# Patient Record
Sex: FEMALE | Race: WHITE
[De-identification: names, ages, dates, MRNs, and addresses within clinical notes are randomized per-mention and may not be internally consistent; named-entity substitution may affect disease eponyms.]

---

## 2018-02-11 ENCOUNTER — HOSPITAL ENCOUNTER (INPATIENT)
Dept: HOSPITAL 33 - ED | Age: 78
LOS: 4 days | Discharge: NURSING, REHAB OR LONG-TERM CARE | DRG: 536 | End: 2018-02-15
Attending: GENERAL PRACTICE | Admitting: GENERAL PRACTICE
Payer: MEDICARE

## 2018-02-11 DIAGNOSIS — E03.9: ICD-10-CM

## 2018-02-11 DIAGNOSIS — I10: ICD-10-CM

## 2018-02-11 DIAGNOSIS — M19.90: ICD-10-CM

## 2018-02-11 DIAGNOSIS — E86.0: ICD-10-CM

## 2018-02-11 DIAGNOSIS — M25.551: ICD-10-CM

## 2018-02-11 DIAGNOSIS — G30.9: ICD-10-CM

## 2018-02-11 DIAGNOSIS — S32.039A: ICD-10-CM

## 2018-02-11 DIAGNOSIS — E11.9: ICD-10-CM

## 2018-02-11 DIAGNOSIS — I25.2: ICD-10-CM

## 2018-02-11 DIAGNOSIS — F02.80: ICD-10-CM

## 2018-02-11 DIAGNOSIS — S32.9XXA: Primary | ICD-10-CM

## 2018-02-11 DIAGNOSIS — M25.552: ICD-10-CM

## 2018-02-11 DIAGNOSIS — F03.90: ICD-10-CM

## 2018-02-11 DIAGNOSIS — E53.8: ICD-10-CM

## 2018-02-11 DIAGNOSIS — I25.10: ICD-10-CM
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
## 2018-02-11 DIAGNOSIS — Z79.899: ICD-10-CM

## 2018-02-11 DIAGNOSIS — F32.9: ICD-10-CM

## 2018-02-11 LAB
AMORPH CRY URNS QL MICRO: (no result) /HPF
ANION GAP SERPL CALC-SCNC: 13.6 MEQ/L (ref 5–15)
BASOPHILS # BLD AUTO: 0.01 10*3/UL (ref 0–0.4)
BASOPHILS NFR BLD AUTO: 0.1 % (ref 0–0.4)
BUN SERPL-MCNC: 49 MG/DL (ref 9–20)
CALCIUM SPEC-MCNC: 9.5 MG/DL (ref 8.5–10.1)
CHLORIDE SERPL-SCNC: 102 MEQ/L (ref 98–107)
CO2 SERPL-SCNC: 26.4 MEQ/L (ref 21–32)
CREAT SERPL-MCNC: 1.6 MG/DL (ref 0.55–1.3)
EOSINOPHIL # BLD AUTO: 0.07 10*3/UL (ref 0–0.5)
GFR SERPLBLD BASED ON 1.73 SQ M-ARVRAT: 33 ML/MIN
GLUCOSE SERPL-MCNC: 129 MG/DL (ref 70–110)
GLUCOSE UR-MCNC: NEGATIVE MG/DL
GRANULOCYTES # BLD AUTO: 7.28 10*3/UL (ref 1.4–6.9)
HCT VFR BLD AUTO: 41.3 % (ref 35–47)
HGB BLD-MCNC: 13.3 GM/DL (ref 12–16)
LYMPHOCYTES # SPEC AUTO: 1.04 10*3/UL (ref 1–4.6)
MCH RBC QN AUTO: 28 PG (ref 26–32)
MCHC RBC AUTO-ENTMCNC: 32.2 G/DL (ref 32–36)
MONOCYTES # BLD AUTO: 0.78 10*3/UL (ref 0–1.3)
NEUTROPHILS NFR BLD AUTO: 79.3 % (ref 36–66)
PLATELET # BLD AUTO: 264 K/MM3 (ref 150–450)
POTASSIUM SERPLBLD-SCNC: 4.1 MEQ/L (ref 3.5–5.1)
PROT UR STRIP-MCNC: (no result) MG/DL
RBC # BLD AUTO: 4.75 M/MM3 (ref 4.1–5.4)
SODIUM SERPL-SCNC: 138 MEQ/L (ref 136–145)
WBC # BLD AUTO: 9.2 K/MM3 (ref 4–10.5)
WBC URNS QL MICRO: (no result) /HPF (ref 0–5)

## 2018-02-11 PROCEDURE — 83721 ASSAY OF BLOOD LIPOPROTEIN: CPT

## 2018-02-11 PROCEDURE — 82607 VITAMIN B-12: CPT

## 2018-02-11 PROCEDURE — 36415 COLL VENOUS BLD VENIPUNCTURE: CPT

## 2018-02-11 PROCEDURE — 80061 LIPID PANEL: CPT

## 2018-02-11 PROCEDURE — 72192 CT PELVIS W/O DYE: CPT

## 2018-02-11 PROCEDURE — 82306 VITAMIN D 25 HYDROXY: CPT

## 2018-02-11 PROCEDURE — 99285 EMERGENCY DEPT VISIT HI MDM: CPT

## 2018-02-11 PROCEDURE — 81000 URINALYSIS NONAUTO W/SCOPE: CPT

## 2018-02-11 PROCEDURE — 72170 X-RAY EXAM OF PELVIS: CPT

## 2018-02-11 PROCEDURE — 96360 HYDRATION IV INFUSION INIT: CPT

## 2018-02-11 PROCEDURE — 73552 X-RAY EXAM OF FEMUR 2/>: CPT

## 2018-02-11 PROCEDURE — 80048 BASIC METABOLIC PNL TOTAL CA: CPT

## 2018-02-11 PROCEDURE — 80053 COMPREHEN METABOLIC PANEL: CPT

## 2018-02-11 PROCEDURE — 72100 X-RAY EXAM L-S SPINE 2/3 VWS: CPT

## 2018-02-11 PROCEDURE — 36000 PLACE NEEDLE IN VEIN: CPT

## 2018-02-11 PROCEDURE — 84443 ASSAY THYROID STIM HORMONE: CPT

## 2018-02-11 PROCEDURE — 85025 COMPLETE CBC W/AUTO DIFF WBC: CPT

## 2018-02-11 NOTE — ERPHSYRPT
- History of Present Illness


Time Seen by Provider: 02/11/18 20:34


Source: patient


Exam Limitations: no limitations


Physician History: 





YESTERDAY AT HOME PT WAS IN THE KITCHEN AND TURNED TO TALK TO HER  WHEN 

SHE FELL BACKWARDS ONTO THE KITCHEN TABLE WITH RESULTANT BILATERAL HIP PAIN. PT 

DENIES CHEST PAIN, SHORTNESS OF AIR, TINGLING/NUMBNESS, WEAKNESS, ABDOMINAL 

PAIN. 


Allergies/Adverse Reactions: 








No Known Drug Allergies Allergy (Verified 02/11/18 20:57)


 





Hx Tetanus, Diphtheria Vaccination/Date Given: Yes (unknown)


Hx Influenza Vaccination/Date Given: No (unknown)


Hx Pneumococcal Vaccination/Date Given: No (unknown)





- Review of Systems


Constitutional: No Fever, No Chills


Respiratory: No Dyspnea


Cardiac: No Chest Pain


Abdominal/Gastrointestinal: No Abdominal Pain


Musculoskeletal: Other (BILATERAL HIP PAIN)


Neurological: No Headache


All Other Systems: Reviewed and Negative





- Past Medical History


Pertinent Past Medical History: Yes


Neurological History: Alzheimer's Disease, Dementia, TIA


ENT History: Cataracts


Cardiac History: Angina, Arrhythmia, Hypertension, Myocardial Infarction (MI)


Respiratory History: No Pertinent History


Endocrine Medical History: Hypothyroidism


Musculoskeletal History: Osteoarthritis


GI Medical History: No Pertinent History


 History: No Pertinent History


Psycho-Social History: Depression


Female Reproductive Disorders: No Pertinent History


Other Medical History: possible thyroid cancer (pt never followed up with dr)





- Past Surgical History


Past Surgical History: Yes


Neuro Surgical History: No Pertinent History


Cardiac: CABG, Cardiac Stent


Respiratory: No Pertinent History


Gastrointestinal: Hernia Repair


Genitourinary: No Pertinent History


Musculoskeletal: No Pertinent History


Female Surgical History: No Pertinent History


Other Surgical History: 3 hernia repairs





- Social History


Smoking Status: Former smoker


Exposure to second hand smoke: No


Drug Use: none


Patient Lives Alone: No (lives at home with .)





- Nursing Vital Signs


Nursing Vital Signs: 


 Initial Vital Signs











Temperature  97.2 F   02/11/18 20:49


 


Pulse Rate  82   02/11/18 20:49


 


Respiratory Rate  18   02/11/18 20:49


 


Blood Pressure  198/110   02/11/18 20:49


 


O2 Sat by Pulse Oximetry  98   02/11/18 20:49








 Pain Scale











Pain Intensity                 3

















- [place] Coma Score


Best Eye Response ([place]): (4) open spontaneously


Best Verbal Response ([place]): (5) oriented


Best Motor Response ([place]): (6) obeys commands


[place] Total: 15





- Physical Exam


General Appearance: alert


Head Injury: no evidence of injury


Eye Exam: eyes nml inspection


ENT Exam: airway nml, nml ext.inspection, other (DRY mm), No decreased hearing


Neck Exam: trachea midline


Respiratory/Chest Exam: normal breath sounds


Cardiovascular Exam: normal heart sounds


Gastrointestinal Exam: soft, normal bowel sounds, hernia (LARGE ABDOMINAL HERNIA

(ONGOING))


Back Exam: normal inspection, No vertebral tenderness


Extremity Exam: normal range of motion, tenderness (MILD TENDERNESS OVER BOTH 

HIPS WITHOUT BRUISING OR ABRASION), No pedal edema


Peripheral Pulses: dorsalis-pedis (R): 2+, dorsalis-pedis (L): 2+


Neurologic Exam: alert, cooperative


Skin Exam: warm, dry





- Course


Nursing assessment & vital signs reviewed: Yes


Ordered Tests: 


 Active Orders 24 hr











 Category Date Time Status


 


 IV Insertion STAT Care  02/11/18 21:26 Active


 


 FEMUR Stat Exams  02/11/18 20:48 Taken


 


 FEMUR Stat Exams  02/11/18 22:47 Taken


 


 PELVIS (1 OR 2 VIEWS) Stat Exams  02/11/18 20:48 Taken


 


 PELVIS WITHOUT CONTRAST [CT] Stat Exams  02/11/18 22:08 Taken


 


 BMP Stat Lab  02/11/18 21:00 Completed


 


 CBC W DIFF Stat Lab  02/11/18 21:00 Completed


 


 UA W/ MICROSCOPIC Stat Lab  02/11/18 23:17 Completed








Medication Summary














Discontinued Medications














Generic Name Dose Route Start Last Admin





  Trade Name Manuel  PRN Reason Stop Dose Admin


 


Clonidine  0.1 mg  02/11/18 20:50  02/11/18 20:56





  Catapres 0.1 Mg***  PO  02/11/18 20:51  0.1 mg





  STAT ONE   Administration


 


Clonidine  Confirm  02/11/18 20:54  





  Catapres 0.1 Mg***  Administered  02/11/18 20:55  





  Dose   





  0.1 mg   





  .ROUTE   





  .STK-MED ONE   


 


Sodium Chloride  1,000 mls @ 999 mls/hr  02/11/18 21:26  02/11/18 22:06





  Sodium Chloride 0.9% 1000 Ml  IV  02/11/18 22:26  999 mls/hr





  .Q1H1M STA   Administration


 


Sodium Chloride  Confirm  02/11/18 21:42  





  Sodium Chloride 0.9% 1000 Ml  Administered  02/11/18 21:43  





  Dose   





  1,000 mls @ ud   





  .ROUTE   





  .STK-MED ONE   











Lab/Rad Data: 


 Laboratory Result Diagrams





 02/11/18 21:00 





 02/11/18 21:00 





 Laboratory Results











  02/11/18 02/11/18 02/11/18 Range/Units





  23:17 21:00 21:00 


 


WBC    9.2  (4.0-10.5)  K/mm3


 


RBC    4.75  (4.1-5.4)  M/mm3


 


Hgb    13.3  (12.0-16.0)  gm/dl


 


Hct    41.3  (35-47)  %


 


MCV    86.9  ()  fl


 


MCH    28.0  (26-32)  pg


 


MCHC    32.2  (32-36)  g/dl


 


RDW    14.0  (11.5-14.0)  %


 


Plt Count    264  (150-450)  K/mm3


 


MPV    10.8 H  (6-9.5)  fl


 


Gran %    79.3 H  (36.0-66.0)  %


 


Lymphocytes %    11.3 L  (24.0-44.0)  %


 


Monocytes %    8.5  (0.0-12.0)  %


 


Eosinophils %    0.8  (0.00-5.0)  %


 


Basophils %    0.1  (0.0-0.4)  %


 


Basophils #    0.01  (0-0.4)  


 


Sodium   138   (136-145)  mEq/L


 


Potassium   4.1   (3.5-5.1)  mEq/L


 


Chloride   102   ()  mEq/L


 


Carbon Dioxide   26.4   (21-32)  mEq/L


 


Anion Gap   13.6   (5-15)  MEQ/L


 


BUN   49 H   (9-20)  mg/dL


 


Creatinine   1.60 H   (0.55-1.30)  mg/dl


 


Estimated GFR   33   ML/MIN


 


Glucose   129 H   ()  MG/DL


 


Calcium   9.5   (8.5-10.1)  mg/dL


 


Ur Collection Type  CATH    


 


Urine Color  YELLOW    (YELLOW)  


 


Urine Appearance  CLEAR    (CLEAR)  


 


Urine pH  5.0    (5-6)  


 


Ur Specific Gravity  1.015    (1.005-1.025)  


 


Urine Protein  TRACE    (Negative)  


 


Urine Ketones  NEGATIVE    (NEGATIVE)  


 


Urine Blood  NEGATIVE    (0-5)  Jean-Claude/ul


 


Urine Nitrite  NEGATIVE    (NEGATIVE)  


 


Urine Bilirubin  NEGATIVE    (NEGATIVE)  


 


Urine Urobilinogen  NORMAL    (0-1)  mg/dL


 


Ur Leukocyte Esterase  NEGATIVE    (NEGATIVE)  


 


Urine Microscopic RBC  0-2    (0-2)  /HPF


 


Urine Microscopic WBC  0-2    (0-5)  /HPF


 


Ur Epithelial Cells  FEW    (FEW)  /HPF


 


Amorphous Crystals  FEW    (NEGATIVE)  /HPF


 


Urine Bacteria  FEW    (NEGATIVE)  /HPF


 


Urine Culture Reflexed  NO    (NO)  


 


Urine Glucose  NEGATIVE    (NEGATIVE)  mg/dL


 


Specimen Received  2/11/18 1252    














- Progress


Discussed with : aMcy (OBS - [id number])





- Departure


Time of Disposition: 00:21


Departure Disposition: Observation


Clinical Impression: 


 PELVIC FRACTURE, DEHYDRATION, HTN, ALZHEIMER'S DISEASE, HYPOTHROIDISM, 

ARTHRITIS, DEPRESSION





Condition: Stable


Critical Care Time: No


Referrals: 


ANYI GARCIA [Primary Care Provider] -

## 2018-02-12 LAB
ALBUMIN SERPL-MCNC: 2.9 G/DL (ref 3.4–5)
ALP SERPL-CCNC: 49 U/L (ref 46–116)
ALT SERPL-CCNC: 12 U/L (ref 12–78)
ANION GAP SERPL CALC-SCNC: 10.5 MEQ/L (ref 5–15)
AST SERPL QL: 17 U/L (ref 15–37)
BASOPHILS # BLD AUTO: 0.02 10*3/UL (ref 0–0.4)
BASOPHILS NFR BLD AUTO: 0.3 % (ref 0–0.4)
BILIRUB BLD-MCNC: 0.7 MG/DL (ref 0.2–1)
BUN SERPL-MCNC: 41 MG/DL (ref 9–20)
CALCIUM SPEC-MCNC: 8.6 MG/DL (ref 8.5–10.1)
CHLORIDE SERPL-SCNC: 107 MEQ/L (ref 98–107)
CO2 SERPL-SCNC: 25.9 MEQ/L (ref 21–32)
CREAT SERPL-MCNC: 1.38 MG/DL (ref 0.55–1.3)
EOSINOPHIL # BLD AUTO: 0.15 10*3/UL (ref 0–0.5)
GFR SERPLBLD BASED ON 1.73 SQ M-ARVRAT: 39 ML/MIN
GLUCOSE SERPL-MCNC: 127 MG/DL (ref 70–110)
GRANULOCYTES # BLD AUTO: 5.02 10*3/UL (ref 1.4–6.9)
HCT VFR BLD AUTO: 35.4 % (ref 35–47)
HGB BLD-MCNC: 11.3 GM/DL (ref 12–16)
LYMPHOCYTES # SPEC AUTO: 0.99 10*3/UL (ref 1–4.6)
MCH RBC QN AUTO: 28.3 PG (ref 26–32)
MCHC RBC AUTO-ENTMCNC: 31.9 G/DL (ref 32–36)
MONOCYTES # BLD AUTO: 0.58 10*3/UL (ref 0–1.3)
NEUTROPHILS NFR BLD AUTO: 74.3 % (ref 36–66)
PLATELET # BLD AUTO: 206 K/MM3 (ref 150–450)
POTASSIUM SERPLBLD-SCNC: 4.1 MEQ/L (ref 3.5–5.1)
PROT SERPL-MCNC: 6.7 GM/DL (ref 6.4–8.2)
RBC # BLD AUTO: 3.99 M/MM3 (ref 4.1–5.4)
SODIUM SERPL-SCNC: 139 MEQ/L (ref 136–145)
WBC # BLD AUTO: 6.8 K/MM3 (ref 4–10.5)

## 2018-02-12 RX ADMIN — MORPHINE SULFATE PRN MG: 4 INJECTION, SOLUTION INTRAMUSCULAR; INTRAVENOUS at 03:19

## 2018-02-12 RX ADMIN — HYDROCODONE BITARTRATE AND ACETAMINOPHEN PRN TAB: 5; 325 TABLET ORAL at 21:30

## 2018-02-12 RX ADMIN — ASPIRIN SCH MG: 325 TABLET, COATED ORAL at 21:30

## 2018-02-12 RX ADMIN — HYDROCODONE BITARTRATE AND ACETAMINOPHEN PRN TAB: 5; 325 TABLET ORAL at 10:12

## 2018-02-12 RX ADMIN — METOPROLOL TARTRATE SCH MG: 50 TABLET, FILM COATED ORAL at 21:30

## 2018-02-12 RX ADMIN — THERA TABS SCH TAB: TAB at 10:13

## 2018-02-12 NOTE — HP
HISTORY OF PRESENT ILLNESS:  This is a 77 year-old patient who presented to the emergency 
department reporting that she had a fall on 02/11/2018. She tells me that she stepped back 
to lean against the kitchen table and that the table gave way and she fell. She denies 
hitting her head. She denies any chest pain or palpitations before this happened. She 
denies any syncope. She reports she has pain in the small of her back and pain worse when 
she moves her right leg. In the emergency department she was found to have a pelvic 
fracture of her right inferior pubic ramus. She had other x-rays done but there is no 
report from the radiologist on these yet. She reports the pain medicine does help and she 
was able to sleep last night. The patient reports that she does have a walker and cane at 
home but that she does not use these. 



REVIEW OF SYSTEMS:  She denies any dysuria. No abdominal pain. No constipation. No 
diarrhea. No cough. No rhinorrhea. No pain in her hip. She reports pain with trying to 
change positions. No rashes. 



PAST MEDICAL HISTORY:  Vitamin B12 deficiency. Coronary artery disease. Hypothyroidism. 
History of acute renal failure that has resolved. History of diabetes mellitus type 2 
currently diet controlled. 



PAST SURGICAL HISTORY: Hernia repair x3, bypass surgery with Dr. Ryan.



MEDICATIONS:  From her clinic list: Tylenol 650 mg every four hours as needed, aspirin 81 
mg p.o. daily, vitamin B12 1 ml injected every month, levothyroxine 50 mcg p.o. daily, 
metoprolol tartrate 50 mg p.o. b.i.d. and multivitamin 1 tablet p.o. daily. 



ALLERGIES:  NKDA.



SOCIAL HISTORY:  She used to smoke but does not any longer. She lives with her . 



FAMILY HISTORY:  Her mother had heart disease. Her father had diabetes. 



PHYSICAL EXAMINATION: 

VITAL SIGNS:  Temperature current 98.6F, temperature max 98.6F, heart rate 75 to 94, 
respiratory rate 16 to 19, blood pressure 145 to 203 over 63 to 110, weight 71.2 kg.  
Oxygen saturation 93 to 98% on room air. 

GENERAL: The patient is lying in bed in no acute distress. She is alert and talkative. She 
is oriented to person, place. She said the year is 2014. She knows who the patient 
 is.  

CVS: She has a regular rate and rhythm. No murmurs, gallops or rubs are appreciated. 

CHEST: Clear to auscultation bilaterally. No crackles or wheezes. 

ABDOMEN: Obese. There is a large hernia that is her baseline. No tenderness. No guarding. 
No rigidity. Normal bowel sounds. 

EXTREMITIES: No clubbing, cyanosis or edema. She has pain with movement of her right leg 
but she is able to move her leg. Pain with changing positions with automatic bed. 

SKIN: Warm, dry and intact.  



LABORATORY DATA AND TESTS:  Hemoglobin on admission 13.3, repeat 11.3 today. Creatinine 
1.38 today. Albumin 2.9, glucose 127. UA with trace protein otherwise negative. CT scan 
reported in the HPI showed pelvic and femur x-ray without formal reading yet. 



ASSESSMENT AND PLAN:  

1) RIGHT PELVIC FRACTURE: Will ask PT to see her and will ask for orthopedic surgery 
consultation. I will continue with pain control. I have ordered hydrocodone that she can 
take by mouth and have discharge planning to help with disposition. The patient was 
encouraged to use her walker and her cane. 

2) HYPERTENSION: I will restart her metoprolol and use hydralazine as needed. 

3) HYPOTHYROIDISM: Will check her TSH and resume her levothyroxine. 

4) VITAMIN B12 DEFICIENCY:  She usually gets an injection every month. Will check her 
vitamin B12 level. 

5) MEMORY PROBLEMS: She appears to be at her baseline at this time. 

6) DEEP VENOUS THROMBOSIS PROPHYLAXIS: Will plan to use COY hose.

## 2018-02-12 NOTE — XRAY
Indication: Low back pain following fall.



Comparison: None



5 views of the lumbar spine demonstrates 5 lumbar vertebral segments in normal

alignment with age-related osteopenia and L3 superior endplate fracture of

uncertain chronicity with approximately 50% height loss.  Elsewhere mild L5-S1

degenerative disc disease as evidenced by disc space narrowing, endplate

sclerosis/spurring, and bilateral facet arthropathy including minimal L5

spondylolisthesis without spondylolysis.  Lesser degenerative changes seen of

the lower thoracic spine.  Extensive scattered vascular calcifications and

tiny gallstones.



Impression:

1.  L3 superior endplate fracture of uncertain chronicity.

2.  Osteopenia and multilevel degenerative spondylosis including minimal L5

grade 1 spondylolisthesis.

3.  Incidental extensive vascular calcifications and gallstones.

## 2018-02-12 NOTE — XRAY
Indication: Pain following fall.



Comparison: None



2 views of the right femur demonstrates osteopenia, moderate tricompartmental

knee degenerative changes, heavy scattered vascular calcifications, scattered

vascular clips, and CT proven right inferior pubic bone fracture.

## 2018-02-12 NOTE — XRAY
Indication: Pain following fall.



Comparison: None



2 views of the left femur demonstrates osteopenia, mild tricompartmental knee

degenerative changes, and heavy scattered vascular calcifications.  No other

bony, articular, or soft tissue abnormalities.

## 2018-02-12 NOTE — XRAY
Indication: Pain following fall.



Multiple contiguous axial images obtained through the pelvis with special

attention to the osseous structures.  Sagittal and coronal reformatted images

obtained.



Comparison: None



There is osteopenia, mild degenerative changes of both hips, mild degenerative

changes of the lower lumbar spine, and heavy scattered vascular

calcifications.  Nondisplaced fracture seen of the right inferior pubic ramus.

 Mild scattered colonic fecal debris and colonic diverticulosis.



Impression:

1.  Nondisplaced right inferior pubic ramus fracture.

2.  Osteopenia, degenerative changes, and colonic diverticulosis.



Comment: Preliminary interpretation was made by [facility].  No discrepancy.



CTDI 18.53

## 2018-02-12 NOTE — XRAY
Indication: Pain following fall.



Comparison: None



Single AP pelvis demonstrates osteopenia, low lumbar degenerative changes,

heavy scattered vascular calcifications, and CT proven right inferior pubic

bone fracture.

## 2018-02-13 RX ADMIN — METOPROLOL TARTRATE SCH MG: 50 TABLET, FILM COATED ORAL at 10:06

## 2018-02-13 RX ADMIN — HYDROCODONE BITARTRATE AND ACETAMINOPHEN PRN TAB: 5; 325 TABLET ORAL at 23:47

## 2018-02-13 RX ADMIN — ASPIRIN SCH MG: 325 TABLET, COATED ORAL at 10:02

## 2018-02-13 RX ADMIN — HYDROCODONE BITARTRATE AND ACETAMINOPHEN PRN TAB: 5; 325 TABLET ORAL at 14:46

## 2018-02-13 RX ADMIN — HYDROCODONE BITARTRATE AND ACETAMINOPHEN PRN TAB: 5; 325 TABLET ORAL at 10:01

## 2018-02-13 RX ADMIN — LEVOTHYROXINE SODIUM SCH MCG: 75 TABLET ORAL at 10:03

## 2018-02-13 RX ADMIN — METOPROLOL TARTRATE SCH MG: 50 TABLET, FILM COATED ORAL at 21:13

## 2018-02-13 RX ADMIN — THERA TABS SCH TAB: TAB at 10:02

## 2018-02-13 RX ADMIN — ASPIRIN SCH MG: 325 TABLET, COATED ORAL at 21:13

## 2018-02-13 RX ADMIN — DOCUSATE SODIUM SCH MG: 100 CAPSULE, LIQUID FILLED ORAL at 10:03

## 2018-02-13 RX ADMIN — DOCUSATE SODIUM SCH MG: 100 CAPSULE, LIQUID FILLED ORAL at 21:13

## 2018-02-14 LAB
ANION GAP SERPL CALC-SCNC: 11.8 MEQ/L (ref 5–15)
BASOPHILS # BLD AUTO: 0.02 10*3/UL (ref 0–0.4)
BASOPHILS NFR BLD AUTO: 0.3 % (ref 0–0.4)
BUN SERPL-MCNC: 36 MG/DL (ref 9–20)
CALCIUM SPEC-MCNC: 8.8 MG/DL (ref 8.5–10.1)
CHLORIDE SERPL-SCNC: 105 MEQ/L (ref 98–107)
CO2 SERPL-SCNC: 25.6 MEQ/L (ref 21–32)
CREAT SERPL-MCNC: 1.4 MG/DL (ref 0.55–1.3)
EOSINOPHIL # BLD AUTO: 0.18 10*3/UL (ref 0–0.5)
GFR SERPLBLD BASED ON 1.73 SQ M-ARVRAT: 39 ML/MIN
GLUCOSE SERPL-MCNC: 132 MG/DL (ref 70–110)
GRANULOCYTES # BLD AUTO: 5.01 10*3/UL (ref 1.4–6.9)
HCT VFR BLD AUTO: 38 % (ref 35–47)
HGB BLD-MCNC: 12.3 GM/DL (ref 12–16)
LYMPHOCYTES # SPEC AUTO: 0.83 10*3/UL (ref 1–4.6)
MCH RBC QN AUTO: 28.4 PG (ref 26–32)
MCHC RBC AUTO-ENTMCNC: 32.4 G/DL (ref 32–36)
MONOCYTES # BLD AUTO: 0.49 10*3/UL (ref 0–1.3)
NEUTROPHILS NFR BLD AUTO: 76.7 % (ref 36–66)
PLATELET # BLD AUTO: 239 K/MM3 (ref 150–450)
POTASSIUM SERPLBLD-SCNC: 3.9 MEQ/L (ref 3.5–5.1)
RBC # BLD AUTO: 4.33 M/MM3 (ref 4.1–5.4)
SODIUM SERPL-SCNC: 139 MEQ/L (ref 136–145)
WBC # BLD AUTO: 6.5 K/MM3 (ref 4–10.5)

## 2018-02-14 RX ADMIN — HYDROCODONE BITARTRATE AND ACETAMINOPHEN PRN TAB: 5; 325 TABLET ORAL at 15:13

## 2018-02-14 RX ADMIN — HYDROCODONE BITARTRATE AND ACETAMINOPHEN PRN TAB: 5; 325 TABLET ORAL at 09:23

## 2018-02-14 RX ADMIN — DOCUSATE SODIUM SCH MG: 100 CAPSULE, LIQUID FILLED ORAL at 21:35

## 2018-02-14 RX ADMIN — METOPROLOL TARTRATE SCH MG: 50 TABLET, FILM COATED ORAL at 09:25

## 2018-02-14 RX ADMIN — AMLODIPINE BESYLATE SCH MG: 5 TABLET ORAL at 09:25

## 2018-02-14 RX ADMIN — LEVOTHYROXINE SODIUM SCH MCG: 75 TABLET ORAL at 09:23

## 2018-02-14 RX ADMIN — DOCUSATE SODIUM SCH MG: 100 CAPSULE, LIQUID FILLED ORAL at 09:23

## 2018-02-14 RX ADMIN — METOPROLOL TARTRATE SCH MG: 50 TABLET, FILM COATED ORAL at 21:35

## 2018-02-14 RX ADMIN — ASPIRIN SCH MG: 325 TABLET, COATED ORAL at 09:23

## 2018-02-14 RX ADMIN — THERA TABS SCH TAB: TAB at 09:23

## 2018-02-14 RX ADMIN — ASPIRIN SCH MG: 325 TABLET, COATED ORAL at 21:35

## 2018-02-15 VITALS — HEART RATE: 61 BPM | OXYGEN SATURATION: 98 % | DIASTOLIC BLOOD PRESSURE: 79 MMHG | SYSTOLIC BLOOD PRESSURE: 172 MMHG

## 2018-02-15 LAB
CHOLESTANOL SERPL-MCNC: 143 MG/DL (ref 100–200)
HDLC SERPL-MCNC: 36 MG/DL (ref 35–60)
LDLC SERPL DIRECT ASSAY-MCNC: 92 MG/DL (ref 5–99)
TRIGL SERPL-MCNC: 90 MG/DL (ref 30–200)

## 2018-02-15 RX ADMIN — METOPROLOL TARTRATE SCH MG: 50 TABLET, FILM COATED ORAL at 07:47

## 2018-02-15 RX ADMIN — HYDROCODONE BITARTRATE AND ACETAMINOPHEN PRN TAB: 5; 325 TABLET ORAL at 07:46

## 2018-02-15 RX ADMIN — LEVOTHYROXINE SODIUM SCH MCG: 75 TABLET ORAL at 07:47

## 2018-02-15 RX ADMIN — THERA TABS SCH TAB: TAB at 07:47

## 2018-02-15 RX ADMIN — DOCUSATE SODIUM SCH MG: 100 CAPSULE, LIQUID FILLED ORAL at 07:47

## 2018-02-15 RX ADMIN — MORPHINE SULFATE PRN MG: 4 INJECTION, SOLUTION INTRAMUSCULAR; INTRAVENOUS at 08:59

## 2018-02-15 RX ADMIN — ASPIRIN SCH MG: 325 TABLET, COATED ORAL at 07:47

## 2018-02-15 RX ADMIN — AMLODIPINE BESYLATE SCH MG: 5 TABLET ORAL at 07:47

## 2018-02-15 RX ADMIN — HYDROCODONE BITARTRATE AND ACETAMINOPHEN PRN TAB: 5; 325 TABLET ORAL at 12:40

## 2018-02-15 NOTE — PCM.DCORD
- Discharge


Discharge Date: 02/15/18


Disposition: Skilled Care @ [facility]


Condition: Fair


Prescriptions: 


New


   Docusate Sodium 100 mg*** [Colace 100 MG***] 100 mg PO BID  capsule


   Cyanocobalamin 1000 Mcg/ml** [Cyanocobalamin B-12 1000 MCG/ML**] 1,000 mcg 

IM Q30D  vial


   Aspirin  mg*** [Ecotrin 325 MG***] 325 mg PO BID  tablet.ec


   Hydrocodone Bit/Acetaminophen [Hydrocodon-Acetaminophen 5-325] 1 tablet PO 

Q6H PRN #30 tablet MDD 4


     PRN Reason: Pain


   Metoprolol Tartrate 50 mg*** [Lopressor 50 MG***] 100 mg PO BID  tablet


   Amlodipine Besylate 5 mg*** [Norvasc 5 mg***] 5 mg PO QAM  tablet


   Senna 8.6 mg*** [Senokot 8.6 mg***] 8.6 mg PO DAILY PRN  tablet


     PRN Reason: Constipation


   Levothyroxine Sodium 75 Mcg*** [Synthroid 75 Mcg***] 75 mcg PO DAILY  tablet


   Multivitamins,Therapeutic Tab* [Theragran Multivitamin***] 1 tab PO QAM  tab


   Acetaminophen 325 mg*** [Tylenol 325 mg***] 650 mg PO Q4H PRN PRN  tablet


     PRN Reason: Pain And/Or Fever





Discontinued


   Cyanocobalamin 1000 Mcg/ml** [Cyanocobalamin B-12 1000 MCG/ML**] 1,000 mcg 

SQ UD #0 vial


   Aspirin  mg*** [Ecotrin 325 MG***] 325 mg PO QAM #0 tablet.ec


   Metoprolol Tartrate 25 mg*** [Lopressor 25MG Tab***] 25 mg PO BID #0 tab


   Acetaminophen 325 mg*** [Tylenol 325 mg***] 650 mg PO Q4H PRN PRN #0 tablet


     PRN Reason: Pain And/Or Fever


Additional Instructions: 


Follow up with  at [facility] on 3/9/18 @ 11:20 p.m. 

please come at least 15 minutes early to fill out paper work.weight bearing as 

tolerated with No restrictions.





PT/OT evaluation and treatment.





Last vitamin B 12 injection was 1/27/18. This monthly Vit B 12 injection is 

very important for Mrs. Garcia as she has a history of severe, life threatening 

Vit B 12 deficiency. 


Follow up with: 


DEJAH TELLES MD [COURTESY STAFF] - 03/09/18 11:20 am (specialty clinic )


ANYI GARCIA [Primary Care Provider] -

## 2018-02-19 NOTE — DS
DISCHARGE DIAGNOSES: 

1) PELVIC FRACTURE ON RIGHT SIDE. 

2) L3 VERTEBRAL FRACTURE. 

3) ESSENTIAL HYPERTENSION. 

4) DEMENTIA.

5) CORONARY ARTERY DISEASE. 

6) VITAMIN B12 DEFICIENCY.

7) HYPOTHYROIDISM.



DISCHARGE PHYSICAL EXAMINATION: 

VITALS: Temperature current 98.6F, temperature max 98.6F, heart rate 54 to 67, respiratory 
rate 16 to 20, blood pressure 147 to 178 over 74 to 97, weight 68.3 kg.  Oxygen saturation 
92 to 96% on room air.

GENERAL: The patient is sitting up in bed a pleasant, alert, pleasant in no acute 
distress.   She is eating her breakfast. 

CVS: She has a regular rate and rhythm. No murmurs, gallops or rubs are appreciated.

CHEST: Clear to auscultation bilaterally. No crackles or wheezes.

ABDOMEN: Soft, nontender, nondistended with normal bowel sounds. 

EXTREMITIES: No clubbing, cyanosis or edema.

SKIN: Warm, dry and intact. 



HOSPITAL COURSE: 

1) PELVIC FRACTURE: Orthopedic surgeon, Dr. Hall, was consulted and called and said he 
would see her as an outpatient, this appointment has been set up for her. She had her pain 
controlled with oral hydrocodone. She has not needed any IV morphine since Monday. The 
patient reports that she continues to have lower back pain. She denies feeling constipated 
although she has not had any stools. She has been seen by PT here and they recommend 
continued PT so will plan for her to continue with PT and OT at [place]. 

2) L3 VERTEBRAL FRACTURE: This has been taken of as above. The orthopedic surgeon also 
started aspirin 325 mg p.o. b.i.d.  She was started on this once a day for history of 
heart problems. 

3) ESSENTIAL HYPERTENSION:  Her blood pressure was high especially at the first part of 
her hospitalization. Her blood pressure medicine was adjusted. She is currently on 
metoprolol 100 mg p.o. b.i.d. with heart rate just below 60 and amlodipine 5 mg p.o. daily 
was also started. Continue with these and follow her blood pressure. 

4) DEMENTIA: She continued not to be oriented to place during her hospitalization. She was 
very cooperative her entire hospitalization. 

5) CORONARY ARTERY DISEASE: The fasting lipid profile was normal. Will continue with 
aspirin and beta blocker. I am holding off of ACE inhibitor due to some renal 
insufficiency. 

6) VITAMIN B12 DEFICIENCY:  Her  told her that her last vitamin B12 injection was 
01/27/2018. She gets one every month this is very important for her because she had 
life-threatening vitamin B12 deficiency with severe anemia in the past that resolved with 
continued treatment so she will need continued monthly vitamin B12 injection. 

7) HYPOTHYROIDISM: Her TSH was checked and was slightly high and so her Synthroid was 
increased from 150 mcg to 75 mcg. 



DISCHARGE MEDICATIONS: Docusate 100 mg p.o. b.i.d., vitamin B12 1,000 mcg IM every 30 
days, aspirin 325 mg p.o. b.i.d., hydrocodone/acetaminophen 5/325 mg 1 tablet p.o. every 
six hours as needed #30 with no refills, metoprolol tartrate 100 mg p.o. b.i.d., 
amlodipine 5 mg p.o. q.a.m., Senna 8.6 mg p.o. daily as needed for constipation, 
levothyroxine 75 mcg p.o. daily, multivitamin 1 tablet p.o. daily, Tylenol 650 mg p.o. 
every four hours as needed. 



FOLLOW UP:  She is to follow up with me in the clinic and Dr. Hall set up for 03/09/2018 
at 1120 hours.  If she would prefer to follow up with Dr. Raygoza at [place] that is 
fine as well. 



DISPOSITION: The patient was discharged to [place] in fair condition.

## 2018-09-11 ENCOUNTER — HOSPITAL ENCOUNTER (OUTPATIENT)
Dept: HOSPITAL 33 - ED | Age: 78
Setting detail: OBSERVATION
LOS: 1 days | Discharge: NURSING, REHAB OR LONG-TERM CARE | End: 2018-09-12
Attending: GENERAL PRACTICE | Admitting: GENERAL PRACTICE
Payer: MEDICARE

## 2018-09-11 DIAGNOSIS — E53.8: Primary | ICD-10-CM

## 2018-09-11 DIAGNOSIS — E03.9: ICD-10-CM

## 2018-09-11 DIAGNOSIS — N18.4: ICD-10-CM

## 2018-09-11 DIAGNOSIS — I25.10: ICD-10-CM

## 2018-09-11 DIAGNOSIS — I48.91: ICD-10-CM

## 2018-09-11 DIAGNOSIS — I12.9: ICD-10-CM

## 2018-09-11 DIAGNOSIS — Z23: ICD-10-CM

## 2018-09-11 DIAGNOSIS — E11.9: ICD-10-CM

## 2018-09-11 DIAGNOSIS — F02.80: ICD-10-CM

## 2018-09-11 DIAGNOSIS — Z79.01: ICD-10-CM

## 2018-09-11 DIAGNOSIS — G30.9: ICD-10-CM

## 2018-09-11 LAB
ALBUMIN SERPL-MCNC: 4.4 G/DL (ref 3.5–5)
ALP SERPL-CCNC: 92 U/L (ref 38–126)
ALT SERPL-CCNC: 15 U/L (ref 0–35)
ANION GAP SERPL CALC-SCNC: 15.7 MEQ/L (ref 5–15)
AST SERPL QL: 22 U/L (ref 14–36)
BASOPHILS # BLD AUTO: 0.02 10*3/UL (ref 0–0.4)
BASOPHILS NFR BLD AUTO: 0.4 % (ref 0–0.4)
BILIRUB BLD-MCNC: 0.8 MG/DL (ref 0.2–1.3)
BUN SERPL-MCNC: 40 MG/DL (ref 7–17)
CALCIUM SPEC-MCNC: 9.6 MG/DL (ref 8.4–10.2)
CHLORIDE SERPL-SCNC: 101 MMOL/L (ref 98–107)
CO2 SERPL-SCNC: 27 MMOL/L (ref 22–30)
CREAT SERPL-MCNC: 1.92 MG/DL (ref 0.52–1.04)
EOSINOPHIL # BLD AUTO: 0.19 10*3/UL (ref 0–0.5)
GLUCOSE SERPL-MCNC: 116 MG/DL (ref 74–106)
GLUCOSE UR-MCNC: NEGATIVE MG/DL
GRANULOCYTES # BLD AUTO: 3.81 10*3/UL (ref 1.4–6.9)
HCT VFR BLD AUTO: 37.2 % (ref 35–47)
HGB BLD-MCNC: 12.3 GM/DL (ref 12–16)
INR PPP: 3 (ref 0.8–3)
LYMPHOCYTES # SPEC AUTO: 1.03 10*3/UL (ref 1–4.6)
MCH RBC QN AUTO: 28.9 PG (ref 26–32)
MCHC RBC AUTO-ENTMCNC: 33.1 G/DL (ref 32–36)
MONOCYTES # BLD AUTO: 0.34 10*3/UL (ref 0–1.3)
NEUTROPHILS NFR BLD AUTO: 70.7 % (ref 36–66)
PLATELET # BLD AUTO: 300 K/MM3 (ref 150–450)
POTASSIUM SERPLBLD-SCNC: 4.1 MMOL/L (ref 3.5–5.1)
PROT SERPL-MCNC: 8 G/DL (ref 6.3–8.2)
PROT UR STRIP-MCNC: NEGATIVE MG/DL
RBC # BLD AUTO: 4.25 M/MM3 (ref 4.1–5.4)
SODIUM SERPL-SCNC: 140 MMOL/L (ref 137–145)
WBC # BLD AUTO: 5.4 K/MM3 (ref 4–10.5)

## 2018-09-11 PROCEDURE — 82607 VITAMIN B-12: CPT

## 2018-09-11 PROCEDURE — 36415 COLL VENOUS BLD VENIPUNCTURE: CPT

## 2018-09-11 PROCEDURE — 84484 ASSAY OF TROPONIN QUANT: CPT

## 2018-09-11 PROCEDURE — 80053 COMPREHEN METABOLIC PANEL: CPT

## 2018-09-11 PROCEDURE — 84443 ASSAY THYROID STIM HORMONE: CPT

## 2018-09-11 PROCEDURE — 93005 ELECTROCARDIOGRAM TRACING: CPT

## 2018-09-11 PROCEDURE — G0378 HOSPITAL OBSERVATION PER HR: HCPCS

## 2018-09-11 PROCEDURE — 99285 EMERGENCY DEPT VISIT HI MDM: CPT

## 2018-09-11 PROCEDURE — 85025 COMPLETE CBC W/AUTO DIFF WBC: CPT

## 2018-09-11 PROCEDURE — 81002 URINALYSIS NONAUTO W/O SCOPE: CPT

## 2018-09-11 PROCEDURE — P9612 CATHETERIZE FOR URINE SPEC: HCPCS

## 2018-09-11 PROCEDURE — 85610 PROTHROMBIN TIME: CPT

## 2018-09-11 PROCEDURE — 83605 ASSAY OF LACTIC ACID: CPT

## 2018-09-11 PROCEDURE — 71045 X-RAY EXAM CHEST 1 VIEW: CPT

## 2018-09-11 PROCEDURE — 80048 BASIC METABOLIC PNL TOTAL CA: CPT

## 2018-09-11 RX ADMIN — DOCUSATE SODIUM SCH MG: 100 CAPSULE, LIQUID FILLED ORAL at 21:20

## 2018-09-11 RX ADMIN — HYDRALAZINE HYDROCHLORIDE PRN MG: 25 TABLET ORAL at 18:46

## 2018-09-11 RX ADMIN — METOPROLOL TARTRATE SCH MG: 25 TABLET, FILM COATED ORAL at 21:20

## 2018-09-11 NOTE — ERPHSYRPT
- History of Present Illness


Time Seen by Provider: 09/11/18 12:37


Source: EMS


Exam Limitations: no limitations


Physician History: 





The patient is an 78-year-old female brought in by ambulance from home where 

she is having problems breathing taking care of by her .  She was soaked 

in urine and had fecal material on her feet.  She has Alzheimer's and has no 

complaints.  Her past medical history significant for Alzheimer's, CAD, CABG, 

HTN, and hypothyroidism.


Timing/Duration: today


Severity: severe


Character of Deficits: other (confusion)


Deficits: no difficulties


Baseline/Normal Cognition: alert but confused


Current Cognition: alert but confused


Associated Symptoms: confusion


Allergies/Adverse Reactions: 








No Known Drug Allergies Allergy (Verified 09/11/18 13:02)


 





Home Medications: 








Aspirin 81 gm Chew*** [Baby Aspirin 81 mg Chew***] 1 tab PO DAILY 09/11/18 [

History]


Ergocalciferol (Vitamin D2) [Vitamin D2] 50,000 unit PO Q7D 09/11/18 [History]


Metoprolol Tartrate 25 mg*** [Lopressor 25MG Tab***] 25 mg PO BID 09/11/18 [

History]


Warfarin Sodium 2 mg*** [Coumadin 2 MG***] 2 mg PO 09/11/18 [History]


Warfarin Sodium 3 mg*** [Coumadin 3 MG***] 1 tab PO 09/11/18 [History]





Hx Tetanus, Diphtheria Vaccination/Date Given: Yes (unknown)


Hx Influenza Vaccination/Date Given: No (unknown)


Hx Pneumococcal Vaccination/Date Given: No (unknown)





- Review of Systems


Constitutional: No Fever, No Chills


Eyes: No Symptoms


Ears, Nose, & Throat: No Symptoms


Respiratory: No Cough, No Dyspnea


Cardiac: No Chest Pain, No Edema, No Syncope


Abdominal/Gastrointestinal: No Abdominal Pain, No Nausea, No Vomiting, No 

Diarrhea


Genitourinary Symptoms: No Dysuria


Musculoskeletal: No Back Pain, No Neck Pain


Skin: No Rash


Neurological: No Dizziness, No Focal Weakness, No Sensory Changes


Psychological: No Symptoms


Endocrine: No Symptoms


Hematologic/Lymphatic: No Symptoms


Immunological/Allergic: No Symptoms


All Other Systems: Reviewed and Negative





- Past Medical History


Pertinent Past Medical History: Yes


Neurological History: Alzheimer's Disease, Dementia, TIA


ENT History: Cataracts


Cardiac History: Angina, Arrhythmia, Hypertension, Myocardial Infarction (MI)


Respiratory History: No Pertinent History


Endocrine Medical History: Hypothyroidism


Musculoskeletal History: Osteoarthritis


GI Medical History: Hernia


 History: No Pertinent History


Psycho-Social History: Depression


Female Reproductive Disorders: No Pertinent History


Other Medical History: possible thyroid cancer (pt never followed up with dr)





- Past Surgical History


Past Surgical History: Yes


Neuro Surgical History: No Pertinent History


Cardiac: CABG, Cardiac Stent


Respiratory: No Pertinent History


Gastrointestinal: Hernia Repair


Genitourinary: No Pertinent History


Musculoskeletal: No Pertinent History


Female Surgical History: No Pertinent History


Other Surgical History: 3 hernia repairs





- Social History


Smoking Status: Never smoker


Exposure to second hand smoke: No


Drug Use: none


Patient Lives Alone: No (lives at home with .)





- Nursing Vital Signs


Nursing Vital Signs: 


 Initial Vital Signs











Temperature  98.9 F   09/11/18 12:40


 


Pulse Rate  76   09/11/18 12:40


 


Respiratory Rate  18   09/11/18 12:40


 


Blood Pressure  171/74   09/11/18 12:40


 


O2 Sat by Pulse Oximetry  98   09/11/18 12:40








 Pain Scale











Pain Intensity                 0

















- [place] Coma Scale


Best Eye Response ([place]): (4) open spontaneously


Best Verbal Response ([place]): (5) oriented


Best Motor Response ([place]): (6) obeys commands


[place] Total: 15





- Physical Exam


General Appearance: no apparent distress, alert


Eye Exam: bilateral eye: PERRL, EOMI


Ears, Nose, Throat Exam: normal ENT inspection, moist mucous membranes


Neck Exam: normal inspection, non-tender, supple


Respiratory: normal breath sounds, lungs clear, airway intact, No respiratory 

distress


Cardiovascular: regular rate/rhythm, No edema


Gastrointestinal: soft, No tenderness, No distention


Pelvic Exam: not done


Rectal Exam: not done


Back Exam: normal inspection


Extremity Exam: normal inspection, No pedal edema


Mental Status: alert, oriented x 3


CNs Exam: tongue midline


Coordination/Gait: normal gait


Motor/Sensory: no motor deficit


Skin Exam: normal color, warm, dry, No rash


**SpO2 Interpretation**: normal


Oxygen Delivery: Room Air





- Course


EKG Interpreted by Me: RATE, Sinus Rhythm, NORMAL INTERVALS, NORMAL QRS, NORMAL 

ST-T





- Radiology Exams


  ** Chest


X-ray Interpretation: Reviewed by me, Teleradiologist Report (per Dr Powell), 

Negative


Ordered Tests: 


 Active Orders 24 hr











 Category Date Time Status


 


 Cath for Specimen-Straight STAT Care  09/11/18 12:50 Active


 


 EKG-ER Only STAT Care  09/11/18 12:49 Active


 


 IV Insertion STAT Care  09/11/18 12:49 Active


 


 CHEST 1 VIEW (PORTABLE) Stat Exams  09/11/18 12:50 Completed


 


 CBC W DIFF Stat Lab  09/11/18 12:50 Completed


 


 CMP Stat Lab  09/11/18 12:50 Completed


 


 Lactic Acid Stat Lab  09/11/18 12:49 Completed


 


 TROPONIN Q3H Lab  09/11/18 12:50 Completed


 


 TROPONIN Q3H Lab  09/11/18 16:00 Ordered


 


 TROPONIN Q3H Lab  09/11/18 19:00 Ordered


 


 TROPONIN Q3H Lab  09/11/18 22:00 Ordered


 


 TROPONIN Q3H Lab  09/12/18 01:00 Ordered


 


 UA W/RFX UR CULTURE Stat Lab  09/11/18 13:14 Completed











Lab/Rad Data: 


 Laboratory Result Diagrams





 09/11/18 12:50 





 09/11/18 12:50 





 Laboratory Results











  09/11/18 09/11/18 09/11/18 Range/Units





  13:14 12:50 12:50 


 


WBC     (4.0-10.5)  K/mm3


 


RBC     (4.1-5.4)  M/mm3


 


Hgb     (12.0-16.0)  gm/dl


 


Hct     (35-47)  %


 


MCV     ()  fl


 


MCH     (26-32)  pg


 


MCHC     (32-36)  g/dl


 


RDW     (11.5-14.0)  %


 


Plt Count     (150-450)  K/mm3


 


MPV     (6-9.5)  fl


 


Gran %     (36.0-66.0)  %


 


Eos # (Auto)     (0-0.5)  


 


Absolute Lymphs (auto)     (1.0-4.6)  


 


Absolute Monos (auto)     (0.0-1.3)  


 


Lymphocytes %     (24.0-44.0)  %


 


Monocytes %     (0.0-12.0)  %


 


Eosinophils %     (0.00-5.0)  %


 


Basophils %     (0.0-0.4)  %


 


Absolute Granulocytes     (1.4-6.9)  


 


Basophils #     (0-0.4)  


 


Sodium    140  (137-145)  mmol/L


 


Potassium    4.1  (3.5-5.1)  mmol/L


 


Chloride    101  ()  mmol/L


 


Carbon Dioxide    27  (22-30)  mmol/L


 


Anion Gap    15.7 H  (5-15)  MEQ/L


 


BUN    40 H  (7-17)  mg/dL


 


Creatinine    1.92 H  (0.52-1.04)  mg/dL


 


Estimated GFR    26.7  ML/MIN


 


Glucose    116 H  ()  mg/dL


 


Lactic Acid     (0.4-2.0)  


 


Calcium    9.6  (8.4-10.2)  mg/dL


 


Total Bilirubin    0.80  (0.2-1.3)  mg/dL


 


AST    22  (14-36)  U/L


 


ALT    15  (0-35)  U/L


 


Alkaline Phosphatase    92  ()  U/L


 


Troponin I   0.013   (0.000-0.034)  ng/mL


 


Serum Total Protein    8.0  (6.3-8.2)  g/dL


 


Albumin    4.4  (3.5-5.0)  g/dL


 


Ur Collection Type  VOID    


 


Urine Color  YELLOW    (YELLOW)  


 


Urine Appearance  CLEAR    (CLEAR)  


 


Urine pH  8.0    (5-6)  


 


Ur Specific Gravity  1.005    (1.005-1.025)  


 


Urine Protein  NEGATIVE    (Negative)  


 


Urine Ketones  NEGATIVE    (NEGATIVE)  


 


Urine Blood  NEGATIVE    (0-5)  Jean-Claude/ul


 


Urine Nitrite  NEGATIVE    (NEGATIVE)  


 


Urine Bilirubin  NEGATIVE    (NEGATIVE)  


 


Urine Urobilinogen  NORMAL    (0-1)  mg/dL


 


Ur Leukocyte Esterase  NEGATIVE    (NEGATIVE)  


 


Urine Culture Reflexed  NO    (NO)  


 


Urine Glucose  NEGATIVE    (NEGATIVE)  mg/dL


 


Specimen Received  9/11/18 1330    














  09/11/18 09/11/18 Range/Units





  12:50 12:49 


 


WBC  5.4   (4.0-10.5)  K/mm3


 


RBC  4.25   (4.1-5.4)  M/mm3


 


Hgb  12.3   (12.0-16.0)  gm/dl


 


Hct  37.2   (35-47)  %


 


MCV  87.5   ()  fl


 


MCH  28.9   (26-32)  pg


 


MCHC  33.1   (32-36)  g/dl


 


RDW  14.8 H   (11.5-14.0)  %


 


Plt Count  300   (150-450)  K/mm3


 


MPV  11.3 H   (6-9.5)  fl


 


Gran %  70.7 H   (36.0-66.0)  %


 


Eos # (Auto)  0.19   (0-0.5)  


 


Absolute Lymphs (auto)  1.03   (1.0-4.6)  


 


Absolute Monos (auto)  0.34   (0.0-1.3)  


 


Lymphocytes %  19.1 L   (24.0-44.0)  %


 


Monocytes %  6.3   (0.0-12.0)  %


 


Eosinophils %  3.5   (0.00-5.0)  %


 


Basophils %  0.4   (0.0-0.4)  %


 


Absolute Granulocytes  3.81   (1.4-6.9)  


 


Basophils #  0.02   (0-0.4)  


 


Sodium    (137-145)  mmol/L


 


Potassium    (3.5-5.1)  mmol/L


 


Chloride    ()  mmol/L


 


Carbon Dioxide    (22-30)  mmol/L


 


Anion Gap    (5-15)  MEQ/L


 


BUN    (7-17)  mg/dL


 


Creatinine    (0.52-1.04)  mg/dL


 


Estimated GFR    ML/MIN


 


Glucose    ()  mg/dL


 


Lactic Acid   0.9  (0.4-2.0)  


 


Calcium    (8.4-10.2)  mg/dL


 


Total Bilirubin    (0.2-1.3)  mg/dL


 


AST    (14-36)  U/L


 


ALT    (0-35)  U/L


 


Alkaline Phosphatase    ()  U/L


 


Troponin I    (0.000-0.034)  ng/mL


 


Serum Total Protein    (6.3-8.2)  g/dL


 


Albumin    (3.5-5.0)  g/dL


 


Ur Collection Type    


 


Urine Color    (YELLOW)  


 


Urine Appearance    (CLEAR)  


 


Urine pH    (5-6)  


 


Ur Specific Gravity    (1.005-1.025)  


 


Urine Protein    (Negative)  


 


Urine Ketones    (NEGATIVE)  


 


Urine Blood    (0-5)  Jean-Claude/ul


 


Urine Nitrite    (NEGATIVE)  


 


Urine Bilirubin    (NEGATIVE)  


 


Urine Urobilinogen    (0-1)  mg/dL


 


Ur Leukocyte Esterase    (NEGATIVE)  


 


Urine Culture Reflexed    (NO)  


 


Urine Glucose    (NEGATIVE)  mg/dL


 


Specimen Received    














- Progress


Progress: unchanged


Discussed with : Aquilino


Will see patient in: hospital (observation) (per Dr Garcia)


Counseled pt/family regarding: lab results, diagnosis, rad results





- Departure


Time of Disposition: 14:32


Departure Disposition: Observation (per Dr Garcia)


Clinical Impression: 


 Acute renal failure, Alzheimer's dementia





Condition: Stable


Critical Care Time: No


Referrals: 


ANYI GARCIA [Primary Care Provider] -

## 2018-09-11 NOTE — XRAY
Indication: Confusion.



Comparison: November 22, 2016.



Portable chest unchanged again demonstrating lingular atelectasis/scarring,

cardiomegaly with CABG surgery, and aortic calcifications.  Bony thorax intact

again with osteopenia and degenerative changes.  No new/acute findings.

## 2018-09-12 VITALS — SYSTOLIC BLOOD PRESSURE: 199 MMHG | DIASTOLIC BLOOD PRESSURE: 95 MMHG | HEART RATE: 62 BPM | OXYGEN SATURATION: 92 %

## 2018-09-12 LAB
ANION GAP SERPL CALC-SCNC: 14.6 MEQ/L (ref 5–15)
BASOPHILS # BLD AUTO: 0.03 10*3/UL (ref 0–0.4)
BASOPHILS NFR BLD AUTO: 0.5 % (ref 0–0.4)
BUN SERPL-MCNC: 39 MG/DL (ref 7–17)
CALCIUM SPEC-MCNC: 8.9 MG/DL (ref 8.4–10.2)
CHLORIDE SERPL-SCNC: 102 MMOL/L (ref 98–107)
CO2 SERPL-SCNC: 25 MMOL/L (ref 22–30)
CREAT SERPL-MCNC: 1.75 MG/DL (ref 0.52–1.04)
EOSINOPHIL # BLD AUTO: 0.2 10*3/UL (ref 0–0.5)
GLUCOSE SERPL-MCNC: 120 MG/DL (ref 74–106)
GRANULOCYTES # BLD AUTO: 4.04 10*3/UL (ref 1.4–6.9)
HCT VFR BLD AUTO: 34.2 % (ref 35–47)
HGB BLD-MCNC: 11.3 GM/DL (ref 12–16)
INR PPP: 3.1 (ref 0.8–3)
LYMPHOCYTES # SPEC AUTO: 1.08 10*3/UL (ref 1–4.6)
MCH RBC QN AUTO: 29.1 PG (ref 26–32)
MCHC RBC AUTO-ENTMCNC: 33 G/DL (ref 32–36)
MONOCYTES # BLD AUTO: 0.36 10*3/UL (ref 0–1.3)
NEUTROPHILS NFR BLD AUTO: 70.8 % (ref 36–66)
PLATELET # BLD AUTO: 279 K/MM3 (ref 150–450)
POTASSIUM SERPLBLD-SCNC: 4 MMOL/L (ref 3.5–5.1)
RBC # BLD AUTO: 3.87 M/MM3 (ref 4.1–5.4)
SODIUM SERPL-SCNC: 138 MMOL/L (ref 137–145)
WBC # BLD AUTO: 5.7 K/MM3 (ref 4–10.5)

## 2018-09-12 RX ADMIN — DOCUSATE SODIUM SCH MG: 100 CAPSULE, LIQUID FILLED ORAL at 09:55

## 2018-09-12 RX ADMIN — HYDRALAZINE HYDROCHLORIDE PRN MG: 25 TABLET ORAL at 04:10

## 2018-09-12 RX ADMIN — METOPROLOL TARTRATE SCH MG: 25 TABLET, FILM COATED ORAL at 09:55

## 2018-09-12 RX ADMIN — HYDRALAZINE HYDROCHLORIDE PRN MG: 25 TABLET ORAL at 09:55

## 2018-09-12 NOTE — PCM.DCORD
- Discharge


Discharge Date: 09/12/18


Disposition: DC TO [facility]


Condition: Fair


Prescriptions: 


New


   HydrALAzine HCL 25 MG TAB*** [Apresoline 25 MG TABLET***] 12.5 mg PO QID PRN

  tablet


     PRN Reason: Hypertension


   Warfarin Sodium 2 mg*** [Coumadin 2 MG***] 2 mg PO DAILY@1800  tablet


   Amlodipine Besylate 5 mg*** [Norvasc 5 mg***] 10 mg PO QAM  tablet


   Acetaminophen 325 mg*** [Tylenol 325 mg***] 650 mg PO Q4H PRN PRN  tablet


     PRN Reason: Pain And/Or Fever





Continue


   Docusate Sodium 100 mg*** [Colace 100 MG***] 100 mg PO BID  capsule


   Cyanocobalamin 1000 Mcg/ml** [Cyanocobalamin B-12 1000 MCG/ML**] 1,000 mcg 

IM Q30D  vial


   Levothyroxine Sodium 75 Mcg*** [Synthroid 75 Mcg***] 75 mcg PO DAILY  tablet


   Multivitamins,Therapeutic Tab* [Theragran Multivitamin***] 1 tab PO QAM  tab


   Metoprolol Tartrate 25 mg*** [Lopressor 25MG Tab***] 25 mg PO BID


   Ergocalciferol (Vitamin D2) [Vitamin D2] 50,000 unit PO Q7D


   Aspirin 81 gm Chew*** [Baby Aspirin 81 mg Chew***] 1 tab PO DAILY





Discontinued


   Amlodipine Besylate 5 mg*** [Norvasc 5 mg***] 5 mg PO QAM  tablet


   Warfarin Sodium 2 mg*** [Coumadin 2 MG***] 2 mg PO UD


   Warfarin Sodium 3 mg*** [Coumadin 3 MG***] 3 mg PO UD


Additional Instructions: 


Check INR on 9/17/18 with diagnosis of long term anticoagulation.  Fall 

precautions. 


Follow up with: 


ANYI GARCIA [Primary Care Provider] - 1 Week

## 2018-09-12 NOTE — HP
HISTORY OF PRESENT ILLNESS:  This is an 78 year-old woman with history of dementia, 
vitamin B12 deficiency, hypothyroidism, diabetes mellitus type 2, coronary artery disease 
who is cared for at home by her . He has had increasing difficulty with caring for 
her and reports that she also will not comply with cleaning herself up and today would not 
let him check her finger stick for her international normalized ratio. Our care 
coordinator, Nicky Schaeffer, has been working with him and with [facility] to 
try to see what we can do about a respite stay for her there but that has not been 
finalized yet. He called today and said that he could not get her blood checked and he was 
having increasing problems caring for her and he has expressed in the past increasing 
problems caring for her. He was instructed that if she was not being compliant with what 
she needed to do to help care for herself to call the ambulance and have her brought to 
the emergency department which they did. The emergency room noted that she had fecal 
material on her feet. Her  reported that within the past two days she had been in 
the bathroom and had an accident and that there was stool all over but she denied that she 
did that. The emergency room also reported that she was soaked in urine. In the emergency 
room she was also found to have an elevated creatinine above her baseline so she was 
admitted for IV fluids and further observation. Discharge planning to help with her 
disposition so that she can receive the appropriate care with her underlying dementia. 



REVIEW OF SYSTEMS:  The patient denies any pain, denies any problems. She just had a bath 
but states she cannot remember getting it although her nurse verifies that she just had a 
bath so the review of systems is unobtainable due to her dementia. 



PAST MEDICAL HISTORY:  Vitamin B12 deficiency, hypothyroidism, coronary artery disease, 
atrial fibrillation which she is on Coumadin for. 



PAST SURGICAL HISTORY: Cardiac surgery with bypass in the past. Hernia repair x3 without 
success. She still has a large abdominal wall hernia. 



MEDICATIONS:  Please see the home medication reconciliation list. 



ALLERGIES:  IN THE OLD CHART NO KNOWN DRUG ALLERGIES.



SOCIAL HISTORY:  She lives at home with her . She had been at [facility] in the 
past after a hospitalization. She used to smoke but no longer does. 



FAMILY HISTORY:  Her mother had heart disease and her father had diabetes. 



PHYSICAL EXAMINATION: 

VITAL SIGNS:  Temperature current 98.3F, temperature max 98.9F, heart rate 61 to 76, 
respiratory rate 16 to 18, blood pressure 171 to 200 over 74 to 98.  Oxygen saturation 94 
to 98% on room air. 

GENERAL: The patient is oriented to place and person. She states the year is 2000 
something and when I asked her who the President of [place] is she said "I want to 
say Milind Novoa but I am not sure who it is".  

CVS:  She has a regular rate and rhythm. No murmurs, gallops or rubs are appreciated.  

CHEST: Clear to auscultation bilaterally. No crackles or wheezes. 

ABDOMEN: Soft, nontender. She has known abdominal wall hernia. No distension. No guarding. 
No rigidity.  

EXTREMITIES: No clubbing, cyanosis or edema. 

SKIN: Warm, dry and intact. 



LABORATORY DATA AND TESTS: Her creatinine was 1.92, glucose 116. CBC within normal limits. 
UA was negative. Chest x-ray was without any acute changes. 



ASSESSMENT AND PLAN:  

1) ACUTE RENAL FAILURE WITH HISTORY OF CHRONIC KIDNEY DISEASE STAGE IV: Will continue with 
gentle IV fluid rehydration and recheck her BMP in the morning. 

2) ALZHEIMER'S DEMENTIA: She most likely will need to be placed where she can be take care 
of as her  as the sole caregiver is having trouble taking care of her at home as he 
reports she is often noncompliant with what he asks her to do. He has also reported in the 
past that she will take like a pound of lunch meat out of the refrigerator and either feed 
it to the dogs or leave it out so that it spoiled. 

3) VITAMIN B12 DEFICIENCY: Will check her vitamin B12 level. 

4) HYPOTHYROIDISM: Will check her TSH and continue her home medications. 

5) HYPERTENSION: I have given her one dose of amlodipine 5 mg p.o., will continue with her 
home doses and also add PRN medication as well. 

6) HISTORY OF CORONARY ARTERY DISEASE: Will continue with aspirin. 

7) ATRIAL FIBRILLATION: Will continue with anticoagulation and have her international 
normalized ratio checked.

## 2018-09-12 NOTE — PCM.NOTE
Date and Time: 09/12/18 0824





Subjective Assessment: 


She denies any pain. She states she just wants to know where everyone is coming 

from.  She thinks she is at home.  





- Review of Systems


Constitutional: No Symptoms


Eyes: No Symptoms


Ears, Nose, & Throat: No Symptoms


Respiratory: No Symptoms


Cardiac: No Symptoms


Abdominal/Gastrointestinal: No Symptoms


Genitourinary Symptoms: No Symptoms


Musculoskeletal: No Symptoms


Skin: No Symptoms


Neurological: No Symptoms


Psychological: Memory Loss





Objective Exam


General Appearance: no apparent distress, alert, other (sitting up, eating 

breakfast)


Neurologic Exam: alert, cooperative, normal mood/affect, other (says she is at 

home; says year is 2008; knows her full name)


Skin Exam: normal color, warm, dry


Respiratory Exam: normal breath sounds, lungs clear, No accessory muscle use, 

No prolonged expirations, No crackles/rales


Cardiovascular Exam: regular rate/rhythm, normal heart sounds, No murmur, No 

friction rub, No gallop


Gastrointestinal/Abdomen Exam: soft, normal bowel sounds, other (known 

abdominal wall hernia), No tenderness, No distention, No mass


Extremity Exam: other (no c/c/e)





OBJECTIVE DATA


Vital Signs: 


 Vital Signs - 24 hr











  Temp Pulse Resp BP Pulse Ox


 


 09/12/18 06:53  98.1 F  71  18  175/84  96


 


 09/12/18 03:00  99.5 F  79  19  171/93  94 L


 


 09/11/18 23:00  98.5 F  61  19  164/77  95


 


 09/11/18 19:30     182/79 


 


 09/11/18 18:15  98.5 F  75  18  173/74  96


 


 09/11/18 16:00     197/94 


 


 09/11/18 15:29  98.3 F  64  18  200/98  94 L


 


 09/11/18 15:26  98.3 F  64  18  200/98  94 L


 


 09/11/18 14:39  98.0 F  66  18  178/95  97


 


 09/11/18 13:24   65  18  187/83  99


 


 09/11/18 12:40  98.9 F  76  18  171/74  98








 Pain Assessment - Last Documented











Pain Intensity                 0


 


Pain Scale Used                0-10 Pain Scale











Intake and Output: 


 Intake & Output











 09/10/18 09/11/18 09/12/18 09/13/18





 06:59 06:59 06:59 06:59


 


Intake Total   1818 


 


Output Total   650 


 


Balance   1168 


 


Weight   60.6 kg 











Lab Results: 


 Lab Results-Last 24 Hours











  09/11/18 09/11/18 09/11/18 Range/Units





  12:49 12:50 12:50 


 


WBC   5.4   (4.0-10.5)  K/mm3


 


RBC   4.25   (4.1-5.4)  M/mm3


 


Hgb   12.3   (12.0-16.0)  gm/dl


 


Hct   37.2   (35-47)  %


 


MCV   87.5   ()  fl


 


MCH   28.9   (26-32)  pg


 


MCHC   33.1   (32-36)  g/dl


 


RDW   14.8 H   (11.5-14.0)  %


 


Plt Count   300   (150-450)  K/mm3


 


MPV   11.3 H   (6-9.5)  fl


 


Gran %   70.7 H   (36.0-66.0)  %


 


Eos # (Auto)   0.19   (0-0.5)  


 


Absolute Lymphs (auto)   1.03   (1.0-4.6)  


 


Absolute Monos (auto)   0.34   (0.0-1.3)  


 


Lymphocytes %   19.1 L   (24.0-44.0)  %


 


Monocytes %   6.3   (0.0-12.0)  %


 


Eosinophils %   3.5   (0.00-5.0)  %


 


Basophils %   0.4   (0.0-0.4)  %


 


Absolute Granulocytes   3.81   (1.4-6.9)  


 


Basophils #   0.02   (0-0.4)  


 


PT     (9.95-12.35)  SECONDS


 


INR     (0.8-3.0)  


 


Sodium    140  (137-145)  mmol/L


 


Potassium    4.1  (3.5-5.1)  mmol/L


 


Chloride    101  ()  mmol/L


 


Carbon Dioxide    27  (22-30)  mmol/L


 


Anion Gap    15.7 H  (5-15)  MEQ/L


 


BUN    40 H  (7-17)  mg/dL


 


Creatinine    1.92 H  (0.52-1.04)  mg/dL


 


Estimated GFR    26.7  ML/MIN


 


Glucose    116 H  ()  mg/dL


 


Lactic Acid  0.9    (0.4-2.0)  


 


Calcium    9.6  (8.4-10.2)  mg/dL


 


Total Bilirubin    0.80  (0.2-1.3)  mg/dL


 


AST    22  (14-36)  U/L


 


ALT    15  (0-35)  U/L


 


Alkaline Phosphatase    92  ()  U/L


 


Troponin I     (0.000-0.034)  ng/mL


 


Serum Total Protein    8.0  (6.3-8.2)  g/dL


 


Albumin    4.4  (3.5-5.0)  g/dL


 


Vitamin B12     (239-931)  pg/mL


 


TSH 3rd Generation     (0.47-4.68)  mIU/L


 


Ur Collection Type     


 


Urine Color     (YELLOW)  


 


Urine Appearance     (CLEAR)  


 


Urine pH     (5-6)  


 


Ur Specific Gravity     (1.005-1.025)  


 


Urine Protein     (Negative)  


 


Urine Ketones     (NEGATIVE)  


 


Urine Blood     (0-5)  Jean-Claude/ul


 


Urine Nitrite     (NEGATIVE)  


 


Urine Bilirubin     (NEGATIVE)  


 


Urine Urobilinogen     (0-1)  mg/dL


 


Ur Leukocyte Esterase     (NEGATIVE)  


 


Urine Culture Reflexed     (NO)  


 


Urine Glucose     (NEGATIVE)  mg/dL


 


Specimen Received     














  09/11/18 09/11/18 09/11/18 Range/Units





  12:50 13:14 14:10 


 


WBC     (4.0-10.5)  K/mm3


 


RBC     (4.1-5.4)  M/mm3


 


Hgb     (12.0-16.0)  gm/dl


 


Hct     (35-47)  %


 


MCV     ()  fl


 


MCH     (26-32)  pg


 


MCHC     (32-36)  g/dl


 


RDW     (11.5-14.0)  %


 


Plt Count     (150-450)  K/mm3


 


MPV     (6-9.5)  fl


 


Gran %     (36.0-66.0)  %


 


Eos # (Auto)     (0-0.5)  


 


Absolute Lymphs (auto)     (1.0-4.6)  


 


Absolute Monos (auto)     (0.0-1.3)  


 


Lymphocytes %     (24.0-44.0)  %


 


Monocytes %     (0.0-12.0)  %


 


Eosinophils %     (0.00-5.0)  %


 


Basophils %     (0.0-0.4)  %


 


Absolute Granulocytes     (1.4-6.9)  


 


Basophils #     (0-0.4)  


 


PT     (9.95-12.35)  SECONDS


 


INR     (0.8-3.0)  


 


Sodium     (137-145)  mmol/L


 


Potassium     (3.5-5.1)  mmol/L


 


Chloride     ()  mmol/L


 


Carbon Dioxide     (22-30)  mmol/L


 


Anion Gap     (5-15)  MEQ/L


 


BUN     (7-17)  mg/dL


 


Creatinine     (0.52-1.04)  mg/dL


 


Estimated GFR     ML/MIN


 


Glucose     ()  mg/dL


 


Lactic Acid     (0.4-2.0)  


 


Calcium     (8.4-10.2)  mg/dL


 


Total Bilirubin     (0.2-1.3)  mg/dL


 


AST     (14-36)  U/L


 


ALT     (0-35)  U/L


 


Alkaline Phosphatase     ()  U/L


 


Troponin I  0.013   < 0.012  (0.000-0.034)  ng/mL


 


Serum Total Protein     (6.3-8.2)  g/dL


 


Albumin     (3.5-5.0)  g/dL


 


Vitamin B12     (239-931)  pg/mL


 


TSH 3rd Generation     (0.47-4.68)  mIU/L


 


Ur Collection Type   VOID   


 


Urine Color   YELLOW   (YELLOW)  


 


Urine Appearance   CLEAR   (CLEAR)  


 


Urine pH   8.0   (5-6)  


 


Ur Specific Gravity   1.005   (1.005-1.025)  


 


Urine Protein   NEGATIVE   (Negative)  


 


Urine Ketones   NEGATIVE   (NEGATIVE)  


 


Urine Blood   NEGATIVE   (0-5)  Jean-Claude/ul


 


Urine Nitrite   NEGATIVE   (NEGATIVE)  


 


Urine Bilirubin   NEGATIVE   (NEGATIVE)  


 


Urine Urobilinogen   NORMAL   (0-1)  mg/dL


 


Ur Leukocyte Esterase   NEGATIVE   (NEGATIVE)  


 


Urine Culture Reflexed   NO   (NO)  


 


Urine Glucose   NEGATIVE   (NEGATIVE)  mg/dL


 


Specimen Received   9/11/18 1330   














  09/11/18 09/11/18 09/11/18 Range/Units





  14:10 15:35 19:23 


 


WBC     (4.0-10.5)  K/mm3


 


RBC     (4.1-5.4)  M/mm3


 


Hgb     (12.0-16.0)  gm/dl


 


Hct     (35-47)  %


 


MCV     ()  fl


 


MCH     (26-32)  pg


 


MCHC     (32-36)  g/dl


 


RDW     (11.5-14.0)  %


 


Plt Count     (150-450)  K/mm3


 


MPV     (6-9.5)  fl


 


Gran %     (36.0-66.0)  %


 


Eos # (Auto)     (0-0.5)  


 


Absolute Lymphs (auto)     (1.0-4.6)  


 


Absolute Monos (auto)     (0.0-1.3)  


 


Lymphocytes %     (24.0-44.0)  %


 


Monocytes %     (0.0-12.0)  %


 


Eosinophils %     (0.00-5.0)  %


 


Basophils %     (0.0-0.4)  %


 


Absolute Granulocytes     (1.4-6.9)  


 


Basophils #     (0-0.4)  


 


PT  35.3 H    (9.95-12.35)  SECONDS


 


INR  3.00    (0.8-3.0)  


 


Sodium     (137-145)  mmol/L


 


Potassium     (3.5-5.1)  mmol/L


 


Chloride     ()  mmol/L


 


Carbon Dioxide     (22-30)  mmol/L


 


Anion Gap     (5-15)  MEQ/L


 


BUN     (7-17)  mg/dL


 


Creatinine     (0.52-1.04)  mg/dL


 


Estimated GFR     ML/MIN


 


Glucose     ()  mg/dL


 


Lactic Acid     (0.4-2.0)  


 


Calcium     (8.4-10.2)  mg/dL


 


Total Bilirubin     (0.2-1.3)  mg/dL


 


AST     (14-36)  U/L


 


ALT     (0-35)  U/L


 


Alkaline Phosphatase     ()  U/L


 


Troponin I    < 0.012  (0.000-0.034)  ng/mL


 


Serum Total Protein     (6.3-8.2)  g/dL


 


Albumin     (3.5-5.0)  g/dL


 


Vitamin B12     (239-931)  pg/mL


 


TSH 3rd Generation   3.840   (0.47-4.68)  mIU/L


 


Ur Collection Type     


 


Urine Color     (YELLOW)  


 


Urine Appearance     (CLEAR)  


 


Urine pH     (5-6)  


 


Ur Specific Gravity     (1.005-1.025)  


 


Urine Protein     (Negative)  


 


Urine Ketones     (NEGATIVE)  


 


Urine Blood     (0-5)  Jean-Claude/ul


 


Urine Nitrite     (NEGATIVE)  


 


Urine Bilirubin     (NEGATIVE)  


 


Urine Urobilinogen     (0-1)  mg/dL


 


Ur Leukocyte Esterase     (NEGATIVE)  


 


Urine Culture Reflexed     (NO)  


 


Urine Glucose     (NEGATIVE)  mg/dL


 


Specimen Received     














  09/11/18 09/11/18 09/12/18 Range/Units





  22:24 Unknown 02:04 


 


WBC     (4.0-10.5)  K/mm3


 


RBC     (4.1-5.4)  M/mm3


 


Hgb     (12.0-16.0)  gm/dl


 


Hct     (35-47)  %


 


MCV     ()  fl


 


MCH     (26-32)  pg


 


MCHC     (32-36)  g/dl


 


RDW     (11.5-14.0)  %


 


Plt Count     (150-450)  K/mm3


 


MPV     (6-9.5)  fl


 


Gran %     (36.0-66.0)  %


 


Eos # (Auto)     (0-0.5)  


 


Absolute Lymphs (auto)     (1.0-4.6)  


 


Absolute Monos (auto)     (0.0-1.3)  


 


Lymphocytes %     (24.0-44.0)  %


 


Monocytes %     (0.0-12.0)  %


 


Eosinophils %     (0.00-5.0)  %


 


Basophils %     (0.0-0.4)  %


 


Absolute Granulocytes     (1.4-6.9)  


 


Basophils #     (0-0.4)  


 


PT     (9.95-12.35)  SECONDS


 


INR     (0.8-3.0)  


 


Sodium     (137-145)  mmol/L


 


Potassium     (3.5-5.1)  mmol/L


 


Chloride     ()  mmol/L


 


Carbon Dioxide     (22-30)  mmol/L


 


Anion Gap     (5-15)  MEQ/L


 


BUN     (7-17)  mg/dL


 


Creatinine     (0.52-1.04)  mg/dL


 


Estimated GFR     ML/MIN


 


Glucose     ()  mg/dL


 


Lactic Acid     (0.4-2.0)  


 


Calcium     (8.4-10.2)  mg/dL


 


Total Bilirubin     (0.2-1.3)  mg/dL


 


AST     (14-36)  U/L


 


ALT     (0-35)  U/L


 


Alkaline Phosphatase     ()  U/L


 


Troponin I  < 0.012   0.018  (0.000-0.034)  ng/mL


 


Serum Total Protein     (6.3-8.2)  g/dL


 


Albumin     (3.5-5.0)  g/dL


 


Vitamin B12   484   (239-931)  pg/mL


 


TSH 3rd Generation     (0.47-4.68)  mIU/L


 


Ur Collection Type     


 


Urine Color     (YELLOW)  


 


Urine Appearance     (CLEAR)  


 


Urine pH     (5-6)  


 


Ur Specific Gravity     (1.005-1.025)  


 


Urine Protein     (Negative)  


 


Urine Ketones     (NEGATIVE)  


 


Urine Blood     (0-5)  Jean-Claude/ul


 


Urine Nitrite     (NEGATIVE)  


 


Urine Bilirubin     (NEGATIVE)  


 


Urine Urobilinogen     (0-1)  mg/dL


 


Ur Leukocyte Esterase     (NEGATIVE)  


 


Urine Culture Reflexed     (NO)  


 


Urine Glucose     (NEGATIVE)  mg/dL


 


Specimen Received     














  09/12/18 09/12/18 09/12/18 Range/Units





  02:04 02:04 02:04 


 


WBC  5.7    (4.0-10.5)  K/mm3


 


RBC  3.87 L    (4.1-5.4)  M/mm3


 


Hgb  11.3 L    (12.0-16.0)  gm/dl


 


Hct  34.2 L    (35-47)  %


 


MCV  88.4    ()  fl


 


MCH  29.1    (26-32)  pg


 


MCHC  33.0    (32-36)  g/dl


 


RDW  14.6 H    (11.5-14.0)  %


 


Plt Count  279    (150-450)  K/mm3


 


MPV  10.8 H    (6-9.5)  fl


 


Gran %  70.8 H    (36.0-66.0)  %


 


Eos # (Auto)  0.20    (0-0.5)  


 


Absolute Lymphs (auto)  1.08    (1.0-4.6)  


 


Absolute Monos (auto)  0.36    (0.0-1.3)  


 


Lymphocytes %  18.9 L    (24.0-44.0)  %


 


Monocytes %  6.3    (0.0-12.0)  %


 


Eosinophils %  3.5    (0.00-5.0)  %


 


Basophils %  0.5    (0.0-0.4)  %


 


Absolute Granulocytes  4.04    (1.4-6.9)  


 


Basophils #  0.03    (0-0.4)  


 


PT    36.5 H  (9.95-12.35)  SECONDS


 


INR    3.10 H  (0.8-3.0)  


 


Sodium   138   (137-145)  mmol/L


 


Potassium   4.0   (3.5-5.1)  mmol/L


 


Chloride   102   ()  mmol/L


 


Carbon Dioxide   25   (22-30)  mmol/L


 


Anion Gap   14.6   (5-15)  MEQ/L


 


BUN   39 H   (7-17)  mg/dL


 


Creatinine   1.75 H   (0.52-1.04)  mg/dL


 


Estimated GFR   29.7   ML/MIN


 


Glucose   120 H   ()  mg/dL


 


Lactic Acid     (0.4-2.0)  


 


Calcium   8.9   (8.4-10.2)  mg/dL


 


Total Bilirubin     (0.2-1.3)  mg/dL


 


AST     (14-36)  U/L


 


ALT     (0-35)  U/L


 


Alkaline Phosphatase     ()  U/L


 


Troponin I     (0.000-0.034)  ng/mL


 


Serum Total Protein     (6.3-8.2)  g/dL


 


Albumin     (3.5-5.0)  g/dL


 


Vitamin B12     (239-931)  pg/mL


 


TSH 3rd Generation     (0.47-4.68)  mIU/L


 


Ur Collection Type     


 


Urine Color     (YELLOW)  


 


Urine Appearance     (CLEAR)  


 


Urine pH     (5-6)  


 


Ur Specific Gravity     (1.005-1.025)  


 


Urine Protein     (Negative)  


 


Urine Ketones     (NEGATIVE)  


 


Urine Blood     (0-5)  Jean-Claude/ul


 


Urine Nitrite     (NEGATIVE)  


 


Urine Bilirubin     (NEGATIVE)  


 


Urine Urobilinogen     (0-1)  mg/dL


 


Ur Leukocyte Esterase     (NEGATIVE)  


 


Urine Culture Reflexed     (NO)  


 


Urine Glucose     (NEGATIVE)  mg/dL


 


Specimen Received     











Radiology Exams: 


 Radiology Procedures











 Category Date Time Status


 


 CHEST 1 VIEW (PORTABLE) Stat Exams  09/11/18 12:50 Completed














Assessment/Plan


(1) Acute renal failure


Current Visit: Yes   Status: Acute   


Assessment & Plan: 


Improving with IV fluids.








(2) Chronic kidney disease, stage IV (severe)


Current Visit: Yes   Status: Acute   Code(s): N18.4 - CHRONIC KIDNEY DISEASE, 

STAGE 4 (SEVERE)   





(3) Alzheimer's dementia


Current Visit: Yes   Status: Chronic   


Assessment & Plan: 


She would benefit from placement at a nursing facility.  Her  is her 

primary care giver and has reported that she can become argumentative and not 

allow him to help care for her by refusing baths and refusing to have her 

finger pricked to have her INR checked at home with [facility] INR.


Code(s): G30.9 - ALZHEIMER'S DISEASE, UNSPECIFIED   





(4) Coronary artery disease


Current Visit: No   Status: Chronic   


Qualifiers: 


   Associated angina: without angina 


Assessment & Plan: 


Continue aspirin.


Code(s): I25.10 - ATHSCL HEART DISEASE OF NATIVE CORONARY ARTERY W/O ANG PCTRS 

  





(5) Essential hypertension


Current Visit: No   Status: Chronic   


Assessment & Plan: 


Increase amlodpine from 5 mg to 10 mg and continue metoprolol. Hydralazine 12.5 

mg po qid as needed.


Code(s): I10 - ESSENTIAL (PRIMARY) HYPERTENSION   





(6) Paroxysmal atrial fibrillation


Current Visit: Yes   Status: Acute   


Assessment & Plan: 


Rate is controlled. Continue with coumadin for anticoagulation. Coumadin 

decreased from 3 mg on Mon, Wed, Fri and 2 mg all other days to 2 mg all days 

today. Continue daily INR.


Code(s): I48.0 - PAROXYSMAL ATRIAL FIBRILLATION   





(7) Hypothyroidism


Current Visit: Yes   Status: Acute   


Assessment & Plan: 


Continue current dose of levothyroxine. TSH was normal.


Code(s): E03.9 - HYPOTHYROIDISM, UNSPECIFIED   





(8) Vitamin B 12 deficiency


Current Visit: Yes   Status: Acute   


Assessment & Plan: 


Vit B12 injection given yesterday in the hospital.  Her Vit B 12 level was 

checked and is in the normal range.


Code(s): E53.8 - DEFICIENCY OF OTHER SPECIFIED B GROUP VITAMINS